# Patient Record
Sex: FEMALE | Race: AMERICAN INDIAN OR ALASKA NATIVE | NOT HISPANIC OR LATINO | Employment: FULL TIME | URBAN - METROPOLITAN AREA
[De-identification: names, ages, dates, MRNs, and addresses within clinical notes are randomized per-mention and may not be internally consistent; named-entity substitution may affect disease eponyms.]

---

## 2022-04-22 ENCOUNTER — TRANSFERRED RECORDS (OUTPATIENT)
Dept: HEALTH INFORMATION MANAGEMENT | Facility: CLINIC | Age: 57
End: 2022-04-22

## 2022-05-12 ENCOUNTER — HOSPITAL ENCOUNTER (OUTPATIENT)
Dept: BEHAVIORAL HEALTH | Facility: HOSPITAL | Age: 57
Discharge: HOME | End: 2022-05-12
Attending: SOCIAL WORKER | Admitting: SOCIAL WORKER
Payer: COMMERCIAL

## 2022-05-12 PROCEDURE — 90791 PSYCH DIAGNOSTIC EVALUATION: CPT | Performed by: SOCIAL WORKER

## 2022-05-12 ASSESSMENT — ANXIETY QUESTIONNAIRES
6. BECOMING EASILY ANNOYED OR IRRITABLE: MORE THAN HALF THE DAYS
2. NOT BEING ABLE TO STOP OR CONTROL WORRYING: NEARLY EVERY DAY
7. FEELING AFRAID AS IF SOMETHING AWFUL MIGHT HAPPEN: NEARLY EVERY DAY
1. FEELING NERVOUS, ANXIOUS, OR ON EDGE: NEARLY EVERY DAY
5. BEING SO RESTLESS THAT IT IS HARD TO SIT STILL: MORE THAN HALF THE DAYS
3. WORRYING TOO MUCH ABOUT DIFFERENT THINGS: NEARLY EVERY DAY
4. TROUBLE RELAXING: NEARLY EVERY DAY
IF YOU CHECKED OFF ANY PROBLEMS ON THIS QUESTIONNAIRE, HOW DIFFICULT HAVE THESE PROBLEMS MADE IT FOR YOU TO DO YOUR WORK, TAKE CARE OF THINGS AT HOME, OR GET ALONG WITH OTHER PEOPLE: EXTREMELY DIFFICULT
GAD7 TOTAL SCORE: 19

## 2022-05-12 ASSESSMENT — PATIENT HEALTH QUESTIONNAIRE - PHQ9: SUM OF ALL RESPONSES TO PHQ QUESTIONS 1-9: 20

## 2022-05-13 ASSESSMENT — ANXIETY QUESTIONNAIRES: GAD7 TOTAL SCORE: 19

## 2022-05-13 NOTE — PLAN OF CARE
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 memory difficulties, panic attacks, minimal energy or motivation and inability to complete tasks.  Brittney states that she has \"always been a go-getter\" and has had lots of energy, would enjoy being busy and enjoyed her life.  She states that she has experienced a significant decline in motivation, energy and is currently in the process of taking a leave of absence from her job so that she can take care of herself and her mental health.  Brittney states that she had a session with a therapist at [facility] who recommended she attend the [facility] program for more support and intense groups to assist her in learning coping skills to deal with depressive and anxiety symptoms. .  2. Psychosocial, Cultural and Contextual Factors: family of origin issues, mental health symptoms, occupational / vocational stress, parent-child stress and relationship stress .  3. Principal DSM5 Diagnoses  (Sustained by DSM5 Criteria Listed Above):   296.33 (F33.2) Major Depressive Disorder, Recurrent Episode, Severe _ and With anxious distress.  4. Other Diagnoses " that is relevant to services:   309.81 (F43.10) Posttraumatic Stress Disorder (includes Posttraumatic Stress Disorder for Children 6 Years and Younger)  Without dissociative symptoms.  5. Prognosis: Expect Improvement and Relieve Acute Symptoms.  6. Likely result of symptoms if not treated: inabliity to return to work, possible hospitalization    I certify that Partial Hospitalization (PHP) services are medically necessary to improve or maintain the client's condition and functional level and to prevent relapse or hospitalization.  PHP services will be provided in lieu of psychiatric hospitalization, no less intensive level of care would be sufficient to provide the medically necessary treatment the client requires.  These services will include group therapy and OT group therapy daily and individual therapy and medications management as needed.  Due to the clinical severity of the symptoms reported by the client, functional impairments exist that significantly disrupt functioning.  The client reports these symptoms negatively impact their quality of life.  Without the recommended medically necessary treatments listed, the client's symptoms are likely to increase in severity and functioning may further decline.  If the client participates and complies with recommended treatment, the prognosis if fair.    Recommendations:     1. Attend and complete the Partial Hospitalization Program.    2.  Plan for Safety and Risk Management:   Recommended that patient call 911 or go to the local ED should there be a change in any of these risk factors..            3. Patient's identified no cultural concerns that will impact her treatment in PHP.      4. Resources/Service Plan:    services are not indicated.   Modifications to assist communication are not indicated.   Additional disability accommodations are not indicated.   The patient  MAY utilize the Alpha Stimulator therapy.   Patient Does not have a pacemaker.        5. Collaboration:   Collaboration / coordination of treatment will be initiated with the following  support professionals: outpatient therapist.      6.  Referrals:  Qualifies for ARMHS (Adult Rehabilitative Mental Health Services) to rehabilitate the areas of functional impairments.    It is my professional opinion that patient:     1. Has symptoms of mental illness that impair function in the following areas:       Mental health symptoms, Mental health service needs, Securing or maintaining employment, Interpersonal skills and Self-care / Independent living     2. Rehabilitative mental health services would reduce symptoms to allow regulated, restored or improved functioning.  Maintain stability, function, preventing risk of significant functional decompensation or more restrictive service setting.      3. Has the cognitive capacity to benefit from rehabilitative mental health techniques and methods.     The following referral(s) will be initiated: Psychiatry. Next Scheduled Appointment: TBD.     A Release of Information has been obtained for the following: outpatient therapist.    7. RAYMOND:    No concerns    8. Records:   These were reviewed at time of assessment.   Information in this assessment was obtained from the medical record and provided by patient who is a good historian.    Patient will have open access to their mental health medical record.      Provider Name/ Credentials:  Nova Barr, [facility]   May 12, 2022                           normal...

## 2022-05-16 ENCOUNTER — HOSPITAL ENCOUNTER (OUTPATIENT)
Dept: BEHAVIORAL HEALTH | Facility: HOSPITAL | Age: 57
Discharge: HOME | End: 2022-05-16
Attending: PSYCHIATRY & NEUROLOGY | Admitting: PSYCHIATRY & NEUROLOGY
Payer: COMMERCIAL

## 2022-05-16 PROCEDURE — H0035 MH PARTIAL HOSP TX UNDER 24H: HCPCS | Performed by: SOCIAL WORKER

## 2022-05-16 NOTE — PROGRESS NOTES
Group Therapy Progress Notes     Client Initial Individualized Goals for Treatment:     Client will learn and practice 1 - 2 coping skills to help improve depression and anxiety by interrupting symptoms with skills.       Area of Treatment Focus:  Cultural/ Racial / Health / Spiritual    Therapeutic Interventions/Treatment Strategies:  Provide education regarding sleep hygiene    Response to Treatment Strategies:  Accepted Feedback, Gave Feedback, Listened , Focused on Goals and Attentive    Name of Groups:  Sleep - Time: 11:10-12:00    Description and Therapeutic Outcome:   OT Life Skills Group - Healthy Sleep Habits  Clients will explore healthy sleep habits to utilize as well as look at their current sleep habits. Clients will identify importance of sleep for overall physical and mental health, receive education on how lack of sleep affects the body, identify barriers to healthy sleep, develop a healthy sleep routine, and utilize handouts and sleep diary to track sleep habits and daily routines affecting sleep.    Brittney notes that her sleep has been less lately due to being busy.        Is this a Weekly Review of the Progress on the Treatment Plan?  YES    Are Treatment Plan Goals being addressed?  YES      Are Treatment Plan Strategies to Address Goals Effective?  YES      Are there any current contracts in place?  YES

## 2022-05-16 NOTE — TREATMENT PLAN
Individualized Treatment Plan     Date of Plan: May 16, 2022    Name: Brittney Oliveira MRN: [id number]    : 1965    Programs:  [facility] ()     DSM-V Diagnoses:  296.33 (F33.2) Major Depressive Disorder, Recurrent Episode, Severe _ and With anxious distress;  Complex PTSD per history   DA Date: 22  Psychosocial & Contextual Factors: family of origin issues, health issues, mental health symptoms, occupational / vocational stress and parent-child stress  WHODAS: 46  LOCUS: 21      Team Members Contributing to Plan:  Dr. Jory Alanis, [facility]  Nova Barr, [facility]  Suzan Frederick, [facility]    Client Strengths:  caring, committed to sobriety, educated, empathetic, employed, goal-focused, good listener, has a previous history of therapy, insightful, intelligent, motivated, open to learning, open to suggestions / feedback, responsible parent, support of family, friends and providers, supportive, wants to learn, willing to ask questions, willing to relate to others and work history    Client Participation in Plan:  Agrees with plan   Received copy of treatment plan     Areas of Vulnerability:  Suicidal Ideation   Anxiety  Depressive symptoms   Physical/medical: lingering Covid symptoms   Trauma/Abuse/Neglect    Long-Term Goals:  Knowledge about illness and management of symptoms   Maintenance of personal safety     Abuse Prevention Plan:  Safe, therapeutic environment   Safety coping plan as needed   Education regarding illness and skill development     Discharge Criteria:  Satisfactory progress toward treatment goals   Improvement re: identified problems and symptoms   Ability to continue recovery at next level of service   Has a discharge plan in place   Regular attendance as scheduled                   Areas of Treatment Focus            Area of Treatment Focus:   Personal Safety  Start Date:    22    Goal:  Target Date:  Status: Active  Client will learn and practice 1 - 2  coping skills to help improve depression and anxiety by interrupting symptoms with skills.      Progress:             Treatment Strategies:   Assist clients in establishing / strengthening support network  Assist to identify treatment goals  Assist with discharge planning  Assess / reassess for appropriate therapy program involvement, encourage participation in therapies  Assess / reassess level of potential for harm to self or others  Engage in safety planning when indicated  Facilitate increased self awareness  Teach adaptive coping skills and communication skills  Use reality based supportive approach    These services will include group therapy and OT group therapy daily and individual therapy and medications management as needed.                 Area of Treatment Focus:   Symptom Stabilization and Management  Start Date:    5/16/22    Goal:  Target Date: 5/20/22 Status: Active  Will Improve Mindfulness / Stay in the Here and Now Intentionally bringing your attention to something beautiful, pleasant, or interesting that is occurring or is present in your immediate environment or experience on a regular basis.      Progress:             Treatment Strategies:   Assist clients in establishing / strengthening support network  Assist to identify treatment goals  Assist with discharge planning  Engage in safety planning when indicated  Facilitate increased self awareness  Teach adaptive coping skills and communication skills  Use reality based supportive approach    These services will include group therapy and OT group therapy daily and individual therapy and medications management as needed.               Area of Treatment Focus:   Community Resources / Support and Discharge Planning  Start Date:    5/23/22    Goal:  Target Date: 5/31/22 Status: Active  Will increase effective use of support / increase ability to ask for help. Identify support needs, create a list of things you could use help with, Identify  attitudes or beliefs about asking for help that interfere with your ability to ask for help and identify more helpful attitudes and Create a return to work, return to school, or return to daily routines plan.      Progress:             Treatment Strategies:   Assist clients in establishing / strengthening support network  Assist with discharge planning  Engage in safety planning when indicated  Facilitate increased self awareness  Teach adaptive coping skills and communication skills  Use reality based supportive approach    These services will include group therapy and OT group therapy daily and individual therapy and medications management as needed.
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
## 2022-05-16 NOTE — PROGRESS NOTES
"Group Therapy Progress Notes     Client Initial Individualized Goals for Treatment:     Client will learn and practice 1 - 2 coping skills to help improve depression and anxiety by interrupting symptoms with skills.    Area of Treatment Focus:  Personal Safety    Therapeutic Interventions/Treatment Strategies:  Assist clients in establishing / strengthening support network  Assist to identify treatment goals  Assist with discharge planning  Assess / reassess for appropriate therapy program involvement, encourage participation in therapies  Assess / reassess level of potential for harm to self or others  Engage in safety planning when indicated  Facilitate increased self awareness  Teach adaptive coping skills and communication skills  Use reality based supportive approach    Response to Treatment Strategies:  Accepted Feedback, Gave Feedback, Listened  and Attentive    Name of Groups:  Dialctical Communication - Time: 10-10:50;     Description and Therapeutic Outcome:     During Dialectical Communication group, Brittney presented as moderately tired, but easily responsive when prompted. She applied the concept of being Dialectical to a \"negative\" friendship by applying 3 Good Things about the person to implement the middle ground.     During the follow up group, Brittney related the concept of being Dialectical to having a diagnosis of Mental Health and the Middle Ground as focusing on self care and her wellness by attending PHP. Brittney had been through DBT but \"did not at that time grasp the concept of being Dialectical\" until today. She also accepted the importance of finding the Middle Ground as 'Relationship is more important than being right\". Brittney presents as intentional regarding her wellness and remains appropriate for PHP.     Is this a Weekly Review of the Progress on the Treatment Plan?  NO    Are Treatment Plan Goals being addressed?  YES      Are Treatment Plan Strategies to Address Goals " Effective?  YES      Are there any current contracts in place?  YES

## 2022-05-17 ENCOUNTER — HOSPITAL ENCOUNTER (OUTPATIENT)
Dept: BEHAVIORAL HEALTH | Facility: HOSPITAL | Age: 57
Discharge: HOME | End: 2022-05-17
Attending: PSYCHIATRY & NEUROLOGY | Admitting: PSYCHIATRY & NEUROLOGY
Payer: COMMERCIAL

## 2022-05-17 PROCEDURE — 90791 PSYCH DIAGNOSTIC EVALUATION: CPT | Performed by: NURSE PRACTITIONER

## 2022-05-17 PROCEDURE — H0035 MH PARTIAL HOSP TX UNDER 24H: HCPCS | Performed by: SOCIAL WORKER
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
## 2022-05-17 NOTE — PROGRESS NOTES
Group Therapy Progress Notes     Client Initial Individualized Goals for Treatment: Client will learn and practice 1 - 2 coping skills to help improve depression and anxiety by interrupting symptoms with skills    Area of Treatment Focus:  Personal Safety    Therapeutic Interventions/Treatment Strategies:  Assist clients in establishing / strengthening support network  Assist to identify treatment goals  Assess / reassess for appropriate therapy program involvement, encourage participation in therapies  Assess / reassess level of potential for harm to self or others  Engage in safety planning when indicated  Facilitate increased self awareness  Teach adaptive coping skills and communication skills  Use reality based supportive approach    Response to Treatment Strategies:  Accepted Feedback, Gave Feedback, Listened  and Attentive    Name of Groups:  Pain Management - Time: 11:10-12:00    Description and Therapeutic Outcome:   Pain Management  OT life skills group with focus on pain management and its relation to physical and emotional pain.   Education provided on tips to manage both emotional and physical pain, including the anti- inflammatory diet, adapting your home environment to reduce pain and manage fatigue, creating a healthy support network and socializing.  Handout given with focus on balance and arthritis friendly exercises, body scan and body awareness.  In OT group today, Brittney presented as quiet but engaged.  States that she experiences some chronic pain.  Discussed how feeling emotional pain can also play in a role in physical pain she feels and how she needs to work towards addressing the emotional pain as well.         Is this a Weekly Review of the Progress on the Treatment Plan?  NO    Are Treatment Plan Goals being addressed?  YES      Are Treatment Plan Strategies to Address Goals Effective?  YES      Are there any current contracts in place?  YES

## 2022-05-17 NOTE — PROGRESS NOTES
Group Therapy Progress Notes     Client Initial Individualized Goals for Treatment:     Client will learn and practice 1 - 2 coping skills to help improve depression and anxiety by interrupting symptoms with skills.    Area of Treatment Focus:  Personal Safety    Therapeutic Interventions/Treatment Strategies:  Assist clients in establishing / strengthening support network  Assist to identify treatment goals  Assist with discharge planning  Assess / reassess for appropriate therapy program involvement, encourage participation in therapies  Assess / reassess level of potential for harm to self or others  Engage in safety planning when indicated  Facilitate increased self awareness  Teach adaptive coping skills and communication skills  Use reality based supportive approach    Response to Treatment Strategies:  Accepted Feedback, Gave Feedback, Listened  and Attentive    Name of Groups:  Psychotherapy wrap up group - 2-3    Description and Therapeutic Outcome:   In psychotherapy wrap up group, Brittney reviewed her take away from today - reviewed Changing my thinking, the THINK skill and mastery with 3 cycles of 21.  She engaged very well giving feedback and support to a peer with just starting yesterday - is presenting as very comfortable in the group.  She reports really liking the concept of 3 cycle of 21 days and understanding how an event becomes PTSD and how it stays.  In the past, she states she doesn't know how to heal it - today the concepts made sense about how to start the healing process with the concept of PTG.  Good discussion about triggers and sensory triggers in our body that can not be controlled - discussed additional sensory ideas to combat this as well as the process of awareness, interruption and implementing a skill.  Really likes the skill of that was then, this is now and reminding herself of this.  Good group for Brittney - she remains appropriate for PHP.      Is this a Weekly Review of the  Progress on the Treatment Plan?  NO    Are Treatment Plan Goals being addressed?  YES      Are Treatment Plan Strategies to Address Goals Effective?  YES      Are there any current contracts in place?  YES

## 2022-05-18 ENCOUNTER — HOSPITAL ENCOUNTER (OUTPATIENT)
Dept: BEHAVIORAL HEALTH | Facility: HOSPITAL | Age: 57
Discharge: HOME | End: 2022-05-18
Attending: PSYCHIATRY & NEUROLOGY | Admitting: PSYCHIATRY & NEUROLOGY
Payer: COMMERCIAL

## 2022-05-18 PROCEDURE — H0035 MH PARTIAL HOSP TX UNDER 24H: HCPCS | Performed by: SOCIAL WORKER
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
## 2022-05-18 NOTE — PROGRESS NOTES
Group Therapy Progress Notes     Client Initial Individualized Goals for Treatment:     Client will learn and practice 1 - 2 coping skills to help improve depression and anxiety by interrupting symptoms with skills.    Area of Treatment Focus:  Cultural/ Racial / Health / Spiritual    Therapeutic Interventions/Treatment Strategies:  Provide education regarding exercise.    Response to Treatment Strategies:  Accepted Feedback, Gave Feedback, Listened  and Focused on Goals    Name of Groups:  Exercise - Time: 11:10-12:00    Description and Therapeutic Outcome:   Exercise  OT life skills group with focus on the benefits of exercise.  Education provided with handouts on positive effects of exercise and mental health, different activities to try, ways to improve follow through, how to take your pulse, and target heart rates for age.  Worksheets provided for clients to create individualized exercise goals and exercise log. Theraband is offered with demonstrations as well as a handouts with specific theraband home exercise program guidelines.      Brittney participated in group today. Does use humor throughout group. Issued maroon and blue t-band.        Is this a Weekly Review of the Progress on the Treatment Plan?  NO    Are Treatment Plan Goals being addressed?  YES      Are Treatment Plan Strategies to Address Goals Effective?  YES      Are there any current contracts in place?  YES
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
## 2022-05-19 ENCOUNTER — TELEPHONE (OUTPATIENT)
Dept: BEHAVIORAL HEALTH | Facility: HOSPITAL | Age: 57
End: 2022-05-19
Payer: COMMERCIAL

## 2022-05-19 ENCOUNTER — HOSPITAL ENCOUNTER (OUTPATIENT)
Dept: BEHAVIORAL HEALTH | Facility: HOSPITAL | Age: 57
Discharge: HOME | End: 2022-05-19
Attending: PSYCHIATRY & NEUROLOGY | Admitting: PSYCHIATRY & NEUROLOGY
Payer: COMMERCIAL

## 2022-05-19 PROCEDURE — H0035 MH PARTIAL HOSP TX UNDER 24H: HCPCS | Performed by: SOCIAL WORKER

## 2022-05-19 NOTE — PROGRESS NOTES
Group Therapy Progress Notes     Client Initial Individualized Goals for Treatment:     Client will learn and practice 1 - 2 coping skills to help improve depression and anxiety by interrupting symptoms with skills.    Area of Treatment Focus:  Cultural/ Racial / Health / Spiritual    Therapeutic Interventions/Treatment Strategies:  Provide education regarding nutrition and mental health.    Response to Treatment Strategies:  Accepted Feedback, Gave Feedback, Listened , Focused on Goals and Attentive    Name of Groups:  Nutrition - Time: 11:10-12:00    Description and Therapeutic Outcome:   OT wellness group on Nutrition (diet/healthy food)  This group explores a variety of topics and educates on the relationship between physical health and mental health and how these aspects support the other.  Clients will learn skills to help with utilizing a balanced diet for health management.  Clients will increase knowledge and awareness on how exercise supports self care and wellness, identify barriers to healthy food choices, and will develop a feasible plan to obtain a healthy meal plan.  Clients will learn how to read Nutrition Facts Labels and will develop mindfulness skills related to eating.    Brittney was quite during group.       Is this a Weekly Review of the Progress on the Treatment Plan?  NO    Are Treatment Plan Goals being addressed?  YES      Are Treatment Plan Strategies to Address Goals Effective?  YES      Are there any current contracts in place?  YES

## 2022-05-19 NOTE — TELEPHONE ENCOUNTER
Writer faxed [place] forms to:    [place] [id number]  Attn: Terrie Schoenbauer  P: [phone]  F: [phone]    Copy made, original sent to scanning.

## 2022-05-19 NOTE — PROGRESS NOTES
Group Therapy Progress Notes     Client Initial Individualized Goals for Treatment:     Client will learn and practice 1 - 2 coping skills to help improve depression and anxiety by interrupting symptoms with skills.    Area of Treatment Focus:  Personal Safety    Therapeutic Interventions/Treatment Strategies:  Assist clients in establishing / strengthening support network  Assist to identify treatment goals  Assist with discharge planning  Assess / reassess for appropriate therapy program involvement, encourage participation in therapies  Assess / reassess level of potential for harm to self or others  Engage in safety planning when indicated  Facilitate increased self awareness  Teach adaptive coping skills and communication skills  Use reality based supportive approach    Response to Treatment Strategies:  Accepted Feedback, Gave Feedback, Listened  and Focused on Goals    Name of Groups:  Codependency - Time: 10-10:50; 1-1:50    Description and Therapeutic Outcome:     During Codependency group, Brittney presented as alert, responsive and with humor. She grasps the concept taught and identified ten of the 14 characteristics on the worksheets relating to Codependency. She accepts that Codependency is a challenge for her. This is evidenced in having difficulty saying No without applying an excuse. Also, difficulty saying NO when tired or busy.    Mone accepts that not saying NO is enabling and hurts rather than helps the other person. She recognizes resentful  feelings when people are not grateful for her efforts at fixing or rescuing their lives. Brittney will apply the 3 C's; Awareness, Ride the Wave; Drop the Rope, and Assertiveness to support being non-codependent. She presents as engaged, provides feedback and remains appropriate for PHP.    Is this a Weekly Review of the Progress on the Treatment Plan?  NO    Are Treatment Plan Goals being addressed?  NO      Are Treatment Plan Strategies to Address Goals  Effective?  YES      Are there any current contracts in place?  YES
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
## 2022-05-20 ENCOUNTER — TELEPHONE (OUTPATIENT)
Dept: BEHAVIORAL HEALTH | Facility: HOSPITAL | Age: 57
End: 2022-05-20
Payer: COMMERCIAL

## 2022-05-23 ENCOUNTER — HOSPITAL ENCOUNTER (OUTPATIENT)
Dept: BEHAVIORAL HEALTH | Facility: HOSPITAL | Age: 57
Discharge: HOME | End: 2022-05-23
Attending: PSYCHIATRY & NEUROLOGY | Admitting: PSYCHIATRY & NEUROLOGY
Payer: COMMERCIAL

## 2022-05-23 PROCEDURE — H0035 MH PARTIAL HOSP TX UNDER 24H: HCPCS | Performed by: SOCIAL WORKER

## 2022-05-23 NOTE — PROGRESS NOTES
Group Therapy Progress Notes     Client Initial Individualized Goals for Treatment:     Client will learn and practice 1 - 2 coping skills to help improve depression and anxiety by interrupting symptoms with skills.    Area of Treatment Focus:  Personal Safety    Therapeutic Interventions/Treatment Strategies:  Assist clients in establishing / strengthening support network  Assist to identify treatment goals  Assist with discharge planning  Assess / reassess for appropriate therapy program involvement, encourage participation in therapies  Assess / reassess level of potential for harm to self or others  Engage in safety planning when indicated  Facilitate increased self awareness  Teach adaptive coping skills and communication skills  Use reality based supportive approach    Response to Treatment Strategies:  Accepted Feedback, Gave Feedback, Listened  and Attentive    Name of Groups:  Psychotherapy group 9-10am    Description and Therapeutic Outcome:   In psychotherapy group, today was Brittney's first day of PHP.  She presented as anxious and quiet but states that she was looking forward to starting the program.  Brittney shares that wants to learn skills that will help her cope with anxiety and depression so that she can return to work.  She was engaged with the group discussions and was supportive to her peers. Brittney is appropriate for PHP.    Is this a Weekly Review of the Progress on the Treatment Plan?  NO    Are Treatment Plan Goals being addressed?  YES      Are Treatment Plan Strategies to Address Goals Effective?  YES      Are there any current contracts in place?  YES

## 2022-05-23 NOTE — PROGRESS NOTES
"Group Therapy Progress Notes     Client Initial Individualized Goals for Treatment:     Will Improve Mindfulness / Stay in the Here and Now Intentionally bringing your attention to something beautiful, pleasant, or interesting that is occurring or is present in your immediate environment or experience on a regular basis.    Area of Treatment Focus:  Symptom Stabilization and Management    Therapeutic Interventions/Treatment Strategies:  Assist clients in establishing / strengthening support network  Assist to identify treatment goals  Assist with discharge planning  Engage in safety planning when indicated  Facilitate increased self awareness  Teach adaptive coping skills and communication skills  Use reality based supportive approach    Response to Treatment Strategies:  Accepted Feedback, Gave Feedback, Listened  and Focused on Goals    Name of Groups:  Coping Skills - Time: 10-10:50; Coping with Shame 1-1:50    Description and Therapeutic Outcome:     During Coping Skills group, Brittney presented as \"tired\" though participatory. She applied during an exercise: 3 Good Things: I am thoughtful; Hardworking; I am saying NO. She is practicing and applying using the GAP, AAA, the THE skill, 3 C's.   Brittney also easily provided 3 Good Things for a peer. She also used \"Not Today\" in humour and is working on PTG.    During Coping With Shame group, Brittney identified her Shame Shield as \"Moving Away\" which relates to withdrawing, hiding, and silencing ourselves. She is aware of this and will focus on the AAA- Awareness, Acceptance, Action; Assertiveness and Boundaries. Brittney also elaborated on the Dialectical skill for a peer to grasp the concept. She presents as intentional regarding her wellness and remains appropriate for PHP.    Is this a Weekly Review of the Progress on the Treatment Plan?  YES    Are Treatment Plan Goals being addressed?  YES      Are Treatment Plan Strategies to Address Goals " Effective?  YES      Are there any current contracts in place?  YES

## 2022-05-23 NOTE — PROGRESS NOTES
"Group Therapy Progress Notes     Client Initial Individualized Goals for Treatment:     Client will learn and practice 1 - 2 coping skills to help improve depression and anxiety by interrupting symptoms with skills.    Area of Treatment Focus:  Personal Safety    Therapeutic Interventions/Treatment Strategies:  Assist clients in establishing / strengthening support network  Assist to identify treatment goals  Assist with discharge planning  Assess / reassess for appropriate therapy program involvement, encourage participation in therapies  Assess / reassess level of potential for harm to self or others  Engage in safety planning when indicated  Facilitate increased self awareness  Teach adaptive coping skills and communication skills  Use reality based supportive approach    Response to Treatment Strategies:  Accepted Feedback, Gave Feedback, Listened  and Focused on Goals    Name of Groups:  Psychotherapy group 9-10a    Description and Therapeutic Outcome:   In psychotherapy group, Brittney shares that she had a good night.  She states that she did not do what she planned to do and agreed to give a couple of people who she is close to rides and one of them grocery shopping.  She struggled with being told that it's ok to say NO if she is tired or does not feel like giving people rides, etc.  She states she feels like she should do this if she doesn't have a reason not to but did acknowledge that she was tired after group yesterday and wanted to go to bed early.  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  She states that she understands that she should say NO more often but that she feels better and more like herself when she helps others and that she was \"ok with her Yes\" to her friends but agrees that she needs to practice NO for the days she does not have the energy.  Brittney was very engaged in group this morning, she continues to be appropriate for PHP.    Is this a Weekly " Review of the Progress on the Treatment Plan?  NO    Are Treatment Plan Goals being addressed?  YES      Are Treatment Plan Strategies to Address Goals Effective?  YES      Are there any current contracts in place?  YES

## 2022-05-23 NOTE — PROGRESS NOTES
Group Therapy Progress Notes     Client Initial Individualized Goals for Treatment:     Will Improve Mindfulness / Stay in the Here and Now Intentionally bringing your attention to something beautiful, pleasant, or interesting that is occurring or is present in your immediate environment or experience on a regular basis.    Area of Treatment Focus:  Symptom Stabilization and Management    Therapeutic Interventions/Treatment Strategies:  Assist clients in establishing / strengthening support network  Assist to identify treatment goals  Assist with discharge planning  Engage in safety planning when indicated  Facilitate increased self awareness  Teach adaptive coping skills and communication skills  Use reality based supportive approach    Response to Treatment Strategies:  Accepted Feedback, Gave Feedback, Listened  and Focused on Goals    Name of Groups:  Psychotherapy wrap up group - 2-3    Description and Therapeutic Outcome:   In psychotherapy wrap up group, Brittney reviewed her take away from today - she noted that she was much more tired today - reviewed that she is wondering if it is her body coming off of covid a month ago, the depression or the new medication she started.  Reviewed that if she is tired right now, her body likely needs it.  She plans to continue to monitor but is frustrated because this is not like her and she does not like how it feels.  Reviewed the concept of shame and shame shields - states she listened more to the group than participated.  She did like the exercise where they each had to go around the give 3 good things about themselves and then everyone went and said one thing about each of the group members.  Is practicing the concept of using the positive and not the negative - good discussion about not focusing on the why but the how.  Reviewed evening plans - will continue to work on being assertive and say no, plans to be intentional tonight.  No safety issues noted - she remains  appropriate for PHP.       Is this a Weekly Review of the Progress on the Treatment Plan?  No    Are Treatment Plan Goals being addressed?  YES      Are Treatment Plan Strategies to Address Goals Effective?  YES      Are there any current contracts in place?  YES

## 2022-05-23 NOTE — PROGRESS NOTES
Group Therapy Progress Notes     Client Initial Individualized Goals for Treatment:     Will Improve Mindfulness / Stay in the Here and Now Intentionally bringing your attention to something beautiful, pleasant, or interesting that is occurring or is present in your immediate environment or experience on a regular basis.       Area of Treatment Focus:  Symptom Stabilization and Management    Therapeutic Interventions/Treatment Strategies:  Provide education regarding medication management    Response to Treatment Strategies:  Accepted Feedback, Gave Feedback, Listened  and Focused on Goals    Name of Groups:  Medication Management - Time: 11:10-12:00    Description and Therapeutic Outcome:   Life Skills OT group - Medication Management  Clients will identify importance of medication compliance for recovery and address barriers to successful medication management.  Education provided in handouts and discussion on the general role of medications, side effects, drug interactions,  management strategies, and safe/effective use.  Clients will identify tools to support medication compliance.  The goal is to help clients understand the many aspects involved with medication management and to utilize tools to develop a routine to maximize independence and wellness recovery specific to medication management.  Brittney notes that she has gotten into the habit of taking her meds at the same time each day.       Is this a Weekly Review of the Progress on the Treatment Plan?  YES    Are Treatment Plan Goals being addressed?  YES      Are Treatment Plan Strategies to Address Goals Effective?  YES      Are there any current contracts in place?  YES

## 2022-05-23 NOTE — PROGRESS NOTES
"Group Therapy Progress Notes     Client Initial Individualized Goals for Treatment:     Client will learn and practice 1 - 2 coping skills to help improve depression and anxiety by interrupting symptoms with skills.    Area of Treatment Focus:  Personal Safety    Therapeutic Interventions/Treatment Strategies:  Assist clients in establishing / strengthening support network  Assist to identify treatment goals  Assist with discharge planning  Assess / reassess for appropriate therapy program involvement, encourage participation in therapies  Assess / reassess level of potential for harm to self or others  Engage in safety planning when indicated  Facilitate increased self awareness  Teach adaptive coping skills and communication skills  Use reality based supportive approach    Response to Treatment Strategies:  Accepted Feedback, Gave Feedback, Listened  and Focused on Goals    Name of Groups:  Psychotherapy wrap up group 2-3     Description and Therapeutic Outcome:   In psychotherapy wrap up group, Brittney shares she had a good day today and reviews her take away.  She states that she \"appears to have some codependency issues\" with humor but shares that she is more aware of this after the group.  She accepts that Codependency is a challenge for her and seems to struggle with saying No when she is tired or when someone needs a ride or money because she believes since she \"can\" help them she \"should\" help them.  She states she needs to focus on when its appropriate to help and when to say NO but still keep her positivity and compassion.  Mone accepts that not saying NO is enabling and hurts rather than helps the other person. She recognizes resentful  feelings when people are not grateful for her efforts at fixing or rescuing their lives. Brittney will apply the 3 C's; Awareness, Ride the Wave; Drop the Rope, and Assertiveness to support being non-codependent. She presents as engaged, provides feedback and remains " appropriate for PHP.    Is this a Weekly Review of the Progress on the Treatment Plan?  NO    Are Treatment Plan Goals being addressed?  NO      Are Treatment Plan Strategies to Address Goals Effective?  YES      Are there any current contracts in place?  YES

## 2022-05-23 NOTE — PROGRESS NOTES
MY COPING PLAN FOR SAFETY          The things that are most important to me and the reasons for living, are: Self and family              My relapse warning signs are: isolating and not talking  I will make my environment safer by: being aware of my surroundings  When in crisis, I will cope in the following ways: (relaxation/self-soothing/distraction/activity) call a family member/friend - relax or complete tasks for distraction  I will use my support system:   Personal Support: I can ask for reminders, support, them to stay with me  Trusted Friend/s:  True, Karolina, or Pema   Family Member/s : Caryl   Other/s: Torey                    Professional Support: I can ask for med changes, emergency appointments, help  Psychiatrist: N/A   Therapist: Hema Calabrese     Other/s: David Saldivar [facility]     I can call a crisis line to know of options I can t see when I am this depressed, anxious or confused:     [place] Area:  [facility], Crisis stabilization [facility]- [phone]  Replaced by [facility] Crisis Line: [phone]  [facility]: [phone]  Sexual Assault Program [facility]: [phone] or [phone]  [place] [facility]: [phone] Ext: 279       Calls answered Mon-Fri-8:00 am--4:30 pm    [place]:  [facility]: [phone]  [facility] first call for help: [phone]  [facility]:  [phone]    [place] Area:  Warm Line: [phone]       Calls answered Tuesday--Saturday 4:00 pm--10:00 pm  [place] Crisis Line - [phone]  [place] [facility] [phone]    [place] Statewide:  [place] Crisis and Referral Services: 4-416-523-7099  National Suicide Prevention Lifeline: 1-900-269-TALK (9764)   - uqm3dzyh- Text  Life  to [address]49[address]  First Call for Help: 2-1-1  [facility] Helpline- 8-142-NNRE-HELP       I will attend my Treatment Program and talk to my one of my therapists: Nova Mares, or Marleen Lugo   I agree  that I will report any thoughts, impulses or plans of suicide, homicide, SIB or substance relapse as they occur, during the treatment day.   Brittney   I agree that I will not act on the above symptoms, but will follow the above plan instead.    If nothing above is working for me, I can go to:   Emergency Care Department - Located at the [address] [address]   You can reach us directly by calling [phone] or call the [facility] center at [phone] or [phone].

## 2022-05-23 NOTE — PROGRESS NOTES
Group Therapy Progress Notes     Client Initial Individualized Goals for Treatment:     Client will learn and practice 1 - 2 coping skills to help improve depression and anxiety by interrupting symptoms with skills.    Area of Treatment Focus:  Personal Safety    Therapeutic Interventions/Treatment Strategies:  Assist clients in establishing / strengthening support network  Assist to identify treatment goals  Assist with discharge planning  Assess / reassess for appropriate therapy program involvement, encourage participation in therapies  Assess / reassess level of potential for harm to self or others  Engage in safety planning when indicated  Facilitate increased self awareness  Teach adaptive coping skills and communication skills  Use reality based supportive approach    Response to Treatment Strategies:  Accepted Feedback, Gave Feedback, Listened  and Focused on Goals    Name of Groups:  Psychotherapy group 9-10am    Description and Therapeutic Outcome:   In psychotherapy group, Brittney shares that she was exhausted after group yesterday and took a nap once she got home.  She states that she [place] the dump with a friend to find eagle feathers and enjoyed getting outside and going for a walk.  She states that she had a good dinner last night and went to bed early. She shares that she liked the topics she learned about yesterday and that she has not learned about Dialectical before and that she realizes that [place] is going to help her and she admits she often becomes defensive and that this and other skills will help her with relationships and with interrupting negative thoughts.  She states that she applied present moment and drop the rope last night when she started having negative thoughts.  Brittney continues to be appropriate for PHP.      Is this a Weekly Review of the Progress on the Treatment Plan?  NO    Are Treatment Plan Goals being addressed?  YES      Are Treatment Plan Strategies to Address  Goals Effective?  YES      Are there any current contracts in place?  YES

## 2022-05-23 NOTE — PROGRESS NOTES
"Group Therapy Progress Notes     Client Initial Individualized Goals for Treatment:     Client will learn and practice 1 - 2 coping skills to help improve depression and anxiety by interrupting symptoms with skills.    Area of Treatment Focus:  Personal Safety    Therapeutic Interventions/Treatment Strategies:  Assist clients in establishing / strengthening support network  Assist to identify treatment goals  Assist with discharge planning  Assess / reassess for appropriate therapy program involvement, encourage participation in therapies  Assess / reassess level of potential for harm to self or others  Engage in safety planning when indicated  Facilitate increased self awareness  Teach adaptive coping skills and communication skills  Use reality based supportive approach    Response to Treatment Strategies:  Accepted Feedback, Gave Feedback, Listened  and Focused on Goals    Name of Groups:  Psychotherapy group 9-10am     Description and Therapeutic Outcome:   In psychotherapy group, she shares she had a good night. 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  She shares that she appreciated the positive message from him.  Brittney states that she went to the [employer] for dinner and saw her ex husbands wife there and was triggered with anxiety and PTSD symptoms. She states that she did take a PRN and used Present Moment and \"my name is\" .  She states that she did feel better once she got home and was able to eat her dinner and get a good nights sleep.  Brittney shares that she also applied drop the rope this morning so that she didn't ruminate about the casino situation. Brittney was positive and engaged this morning, she continues to be appropriate for PHP.      Is this a Weekly Review of the Progress on the Treatment Plan?  NO    Are Treatment Plan Goals " being addressed?  NO      Are Treatment Plan Strategies to Address Goals Effective?  YES      Are there any current contracts in place?  YES

## 2022-05-24 ENCOUNTER — HOSPITAL ENCOUNTER (OUTPATIENT)
Dept: BEHAVIORAL HEALTH | Facility: HOSPITAL | Age: 57
Discharge: HOME | End: 2022-05-24
Attending: PSYCHIATRY & NEUROLOGY | Admitting: PSYCHIATRY & NEUROLOGY
Payer: COMMERCIAL

## 2022-05-24 PROCEDURE — H0035 MH PARTIAL HOSP TX UNDER 24H: HCPCS | Performed by: SOCIAL WORKER

## 2022-05-24 NOTE — PROGRESS NOTES
"Group Therapy Progress Notes     Client Initial Individualized Goals for Treatment:     Will Improve Mindfulness / Stay in the Here and Now Intentionally bringing your attention to something beautiful, pleasant, or interesting that is occurring or is present in your immediate environment or experience on a regular basis.    Area of Treatment Focus:  Symptom Stabilization and Management    Therapeutic Interventions/Treatment Strategies:  Assist clients in establishing / strengthening support network  Assist to identify treatment goals  Assist with discharge planning  Engage in safety planning when indicated  Facilitate increased self awareness  Teach adaptive coping skills and communication skills  Use reality based supportive approach    Response to Treatment Strategies:  Accepted Feedback, Gave Feedback, Listened  and Focused on Goals    Name of Groups:  Self Compassion - Time: 10-10:50; 1-1:50    Description and Therapeutic Outcome:     During Self Compassion group, Brittney presented as focused, participatory and provided positive feedback to the group process. She valued a handout regarding \"Two Wolves\" as \"this relates to my culture. The stories are told and handed down\" over time. She recognizes the importance of Self Compassion and will be mindful to take care of herself as her tendency is to help others.    During the follow up group, Brittney identified that she will work on Meditation for her Self Compassion. She responded that being out in  nature is important to her and her spirituality as well as eagle feathers. She will also work on \"Think Feel Choose\" ; \"Watch your Language\" to be mindful to say kind things to herself; Present Moment; Post Traumatic Growth; 3 C's and 3 Good Things. Brittney presents as intentional regarding her wellness and remains appropriate for PHP.    Is this a Weekly Review of the Progress on the Treatment Plan?  NO    Are Treatment Plan Goals being addressed?  YES      Are " Treatment Plan Strategies to Address Goals Effective?  YES      Are there any current contracts in place?  YES

## 2022-05-24 NOTE — PROGRESS NOTES
Group Therapy Progress Notes     Client Initial Individualized Goals for Treatment:     Will Improve Mindfulness / Stay in the Here and Now Intentionally bringing your attention to something beautiful, pleasant, or interesting that is occurring or is present in your immediate environment or experience on a regular basis.    Area of Treatment Focus:  Symptom Stabilization and Management    Therapeutic Interventions/Treatment Strategies:  Assist clients in establishing / strengthening support network  Assist to identify treatment goals  Assist with discharge planning  Engage in safety planning when indicated  Facilitate increased self awareness  Teach adaptive coping skills and communication skills  Use reality based supportive approach    Response to Treatment Strategies:  Accepted Feedback, Gave Feedback, Listened  and Focused on Goals    Name of Groups:  Psychotherapy wrap up group - 2-3    Description and Therapeutic Outcome:   In psychotherapy wrap up group, Brittney reviewed her take away from today - she was bright and engaging as she reported being more awake and alert today - worked last night but chose to for the right reasons so feels good about it.  Reviewed self compassion today - discussed PTG today but it triggered some PTSD for her - she was able to work through it in the group.  Liked the concept of self talk, to encourage self.  She states she learned growing up to never talk about things, which led to self criticism not self affirmations or self reliance.  Really liked the wolves poem - which ever you feed wins concept - which led to a great discussion about spirituality vs Jainism.  Really liked AAA, present moment, awareness, self talk - reviewed the issue with her son and how it makes her self of self fragile.  Brittney gave helpful feedback to a peer who was really struggling today.  Reviewed evening plans - will continue to work on present moment and plans to be intentional tonight.  No safety  issues noted - she remains appropriate for PHP.       Is this a Weekly Review of the Progress on the Treatment Plan?  No    Are Treatment Plan Goals being addressed?  YES      Are Treatment Plan Strategies to Address Goals Effective?  YES      Are there any current contracts in place?  YES

## 2022-05-24 NOTE — PROGRESS NOTES
Group Therapy Progress Notes     Client Initial Individualized Goals for Treatment: Will Improve Mindfulness / Stay in the Here and Now Intentionally bringing your attention to something beautiful, pleasant, or interesting that is occurring or is present in your immediate environment or experience on a regular basis    Area of Treatment Focus:  Symptom Stabilization and Management    Therapeutic Interventions/Treatment Strategies:  Assist clients in establishing / strengthening support network  Assist to identify treatment goals  Assist with discharge planning  Engage in safety planning when indicated  Facilitate increased self awareness  Teach adaptive coping skills and communication skills  Use reality based supportive approach    Response to Treatment Strategies:  Accepted Feedback, Gave Feedback, Listened , Focused on Goals and Attentive    Name of Groups:  Time Management - Time: 11:10-12:00    Description and Therapeutic Outcome:   Self development group - OT - Time Management  Time management group focuses on assisting clients to define self and increase positive self -regard.  Psychoeducation provided related to developing strategies/skills to support effective time management.  Clients will increase knowledge and awareness of time management, increase knowledge/awareness of skills/techniques/behaviors that support time management and when/how to utilize new time management strategies.  Clients will recognize that other peers share similar feelings, thoughts, and problems, and will expand their knowledge/skills through observing others self exploration and working through issues and personal development.  The goal is to help clients learn strategies to have success with time management ie. prioritizing, task analysis, limiting distractions, giving yourself enough time, and use of lists.  In OT group today Brittney presented as focused and engaged.  States that she is unable to accomplish as much as she has in  the past due to fatigue and MH.  Reports frustration with not knowing why she is feeling this way.  Encouraged Brittney to spend some time focusing on herself rather than working and doing things for others.         Is this a Weekly Review of the Progress on the Treatment Plan?  NO    Are Treatment Plan Goals being addressed?  YES      Are Treatment Plan Strategies to Address Goals Effective?  YES      Are there any current contracts in place?  YES

## 2022-05-25 ENCOUNTER — HOSPITAL ENCOUNTER (OUTPATIENT)
Dept: BEHAVIORAL HEALTH | Facility: HOSPITAL | Age: 57
Discharge: HOME | End: 2022-05-25
Attending: PSYCHIATRY & NEUROLOGY | Admitting: PSYCHIATRY & NEUROLOGY
Payer: COMMERCIAL

## 2022-05-25 PROCEDURE — H0035 MH PARTIAL HOSP TX UNDER 24H: HCPCS | Performed by: SOCIAL WORKER

## 2022-05-25 NOTE — PROGRESS NOTES
"Group Therapy Progress Notes     Client Initial Individualized Goals for Treatment: Will Improve Mindfulness / Stay in the Here and Now Intentionally bringing your attention to something beautiful, pleasant, or interesting that is occurring or is present in your immediate environment or experience on a regular basis    Area of Treatment Focus:  Symptom Stabilization and Management    Therapeutic Interventions/Treatment Strategies:  Assist clients in establishing / strengthening support network  Assist to identify treatment goals  Assist with discharge planning  Engage in safety planning when indicated  Facilitate increased self awareness  Teach adaptive coping skills and communication skills  Use reality based supportive approach    Response to Treatment Strategies:  Accepted Feedback, Gave Feedback, Listened , Focused on Goals, Attentive and Accepted Support    Name of Groups:  Healthy Goals - Time: 11:10-12:00    Description and Therapeutic Outcome:   Healthy Goals  OT life skills group with focus on identification of appropriate goals for overall health and wellbeing.  Clients will create individualized goals, identify barriers to meeting those goals, and problem solve to make goals achievable.  Educated in creating a SMART goal (Specific, Measurable, Attainable, Relevant, Timely).  \"Setting healthy goals\" handout provided as well as individualized goal setting worksheet.  In OT group today, Brittney is focused and engaged.  Identified a goal and worked through how she can make it SMART.         Is this a Weekly Review of the Progress on the Treatment Plan?  NO    Are Treatment Plan Goals being addressed?  YES      Are Treatment Plan Strategies to Address Goals Effective?  YES      Are there any current contracts in place?  YES             "

## 2022-05-25 NOTE — PROGRESS NOTES
"Group Therapy Progress Notes     Client Initial Individualized Goals for Treatment:     Will Improve Mindfulness / Stay in the Here and Now Intentionally bringing your attention to something beautiful, pleasant, or interesting that is occurring or is present in your immediate environment or experience on a regular basis.    Area of Treatment Focus:  Symptom Stabilization and Management    Therapeutic Interventions/Treatment Strategies:  Assist clients in establishing / strengthening support network  Assist to identify treatment goals  Assist with discharge planning  Engage in safety planning when indicated  Facilitate increased self awareness  Teach adaptive coping skills and communication skills  Use reality based supportive approach    Response to Treatment Strategies:  Accepted Feedback, Gave Feedback, Listened  and Focused on Goals    Name of Groups:  Grief - Time: 10-10:50; 1-1:50    Description and Therapeutic Outcome:     During Grief group, Brittney presented as attentive and participatory. She gave positive feedback regarding the topic. She was also able to relate to the fluctuating stages of grief. Brittney related Disenfranchised Grief to \"losing\" her son. She feels alienated from him and \"I will probably never be able to share personally with him again as what I share, he uses against me\". She is working on acceptance of where the relationship is at and will apply Time Limit to not ruminate on it.    During the follow up group, Brittney further identified grief to the loss of the ability to multi task due to age, mental and physical health. She is working on Present Moment, Time Limit, One Thing, 3 C's, Awareness and 3 Good Things. Brittney presents as intentional regarding her wellness and remains appropriate for PHP.    Is this a Weekly Review of the Progress on the Treatment Plan?  NO    Are Treatment Plan Goals being addressed?  YES      Are Treatment Plan Strategies to Address Goals " Effective?  YES      Are there any current contracts in place?  YES

## 2022-05-26 ENCOUNTER — HOSPITAL ENCOUNTER (OUTPATIENT)
Dept: BEHAVIORAL HEALTH | Facility: HOSPITAL | Age: 57
Discharge: HOME | End: 2022-05-26
Attending: PSYCHIATRY & NEUROLOGY | Admitting: PSYCHIATRY & NEUROLOGY
Payer: COMMERCIAL

## 2022-05-26 PROCEDURE — H0035 MH PARTIAL HOSP TX UNDER 24H: HCPCS | Performed by: SOCIAL WORKER

## 2022-05-26 NOTE — PROGRESS NOTES
Group Therapy Progress Notes     Client Initial Individualized Goals for Treatment:     Will Improve Mindfulness / Stay in the Here and Now Intentionally bringing your attention to something beautiful, pleasant, or interesting that is occurring or is present in your immediate environment or experience on a regular basis.    Area of Treatment Focus:  Symptom Stabilization and Management    Therapeutic Interventions/Treatment Strategies:  Assist clients in establishing / strengthening support network  Assist to identify treatment goals  Assist with discharge planning  Engage in safety planning when indicated  Facilitate increased self awareness  Teach adaptive coping skills and communication skills  Use reality based supportive approach    Response to Treatment Strategies:  Accepted Feedback, Gave Feedback, Listened  and Focused on Goals    Name of Groups:  Thinking Errors - Time: 10-10:50; 1-1:50    Description and Therapeutic Outcome:     During Thinking Errors group, Brittney presented as bright and participatory and volunteered to read the whole worksheet on the topic. She identified Filtering out the Positive as  a Common Thinking Error that she deals with. She recognizes the importance of noticing both the positive and the negative and will apply the Pros and Cons concept for balance.    During the follow up group, Brittney further identified:  Emotional Reasoning and Personalization as Common Thinking Errors. She will apply reality thinking by implementing the GAP and Rewiring as well as Know My Truth and 3 Good Things. Brittney presents as intentional regarding her wellness and remains appropriate for PHP.      Is this a Weekly Review of the Progress on the Treatment Plan?  NO    Are Treatment Plan Goals being addressed?  YES      Are Treatment Plan Strategies to Address Goals Effective?  YES      Are there any current contracts in place?  YES

## 2022-05-26 NOTE — PROGRESS NOTES
Group Therapy Progress Notes     Client Initial Individualized Goals for Treatment: Will Improve Mindfulness / Stay in the Here and Now Intentionally bringing your attention to something beautiful, pleasant, or interesting that is occurring or is present in your immediate environment or experience on a regular basis    Area of Treatment Focus:  Symptom Stabilization and Management    Therapeutic Interventions/Treatment Strategies:  Assist clients in establishing / strengthening support network  Assist to identify treatment goals  Assist with discharge planning  Engage in safety planning when indicated  Facilitate increased self awareness  Teach adaptive coping skills and communication skills  Use reality based supportive approach    Response to Treatment Strategies:  Accepted Feedback, Gave Feedback, Listened , Focused on Goals, Attentive and Accepted Support    Name of Groups:  Money Management - Time: 11:10-12:00    Description and Therapeutic Outcome:   OT - Life Skills - Money Management  In this group, clients learn the basics of money management as well as learn about their money management style (spenders, savers, amassers, conscientious manager and risk takers).  Will discuss what their style means for them and discuss basic skills they can use to improve their habits if needed.  Education and handout also given on savings tips, creating a budget and shopping tips.   In OT group today, Brittney presented as focused and engaged.  Stated that she does well with managing her money and budgeting.  Shares with her group some of her money saving tips.        Is this a Weekly Review of the Progress on the Treatment Plan?  NO    Are Treatment Plan Goals being addressed?  YES      Are Treatment Plan Strategies to Address Goals Effective?  YES      Are there any current contracts in place?  YES

## 2022-05-27 ENCOUNTER — TELEPHONE (OUTPATIENT)
Dept: BEHAVIORAL HEALTH | Facility: HOSPITAL | Age: 57
End: 2022-05-27
Attending: PSYCHIATRY & NEUROLOGY
Payer: COMMERCIAL

## 2022-05-27 ENCOUNTER — HOSPITAL ENCOUNTER (OUTPATIENT)
Dept: BEHAVIORAL HEALTH | Facility: HOSPITAL | Age: 57
Discharge: HOME | End: 2022-05-27
Attending: PSYCHIATRY & NEUROLOGY | Admitting: PSYCHIATRY & NEUROLOGY
Payer: COMMERCIAL

## 2022-05-27 PROCEDURE — H0035 MH PARTIAL HOSP TX UNDER 24H: HCPCS | Performed by: SOCIAL WORKER

## 2022-05-27 NOTE — TELEPHONE ENCOUNTER
Patient called stating she would be approximately 30 minutes late for PHP d/t prior engagements with her .

## 2022-05-27 NOTE — PROGRESS NOTES
Group Therapy Progress Notes     Client Initial Individualized Goals for Treatment: Will Improve Mindfulness / Stay in the Here and Now Intentionally bringing your attention to something beautiful, pleasant, or interesting that is occurring or is present in your immediate environment or experience on a regular basis    Area of Treatment Focus:  Symptom Stabilization and Management    Therapeutic Interventions/Treatment Strategies:  Assist clients in establishing / strengthening support network  Assist to identify treatment goals  Assist with discharge planning  Engage in safety planning when indicated  Facilitate increased self awareness  Teach adaptive coping skills and communication skills  Use reality based supportive approach    Response to Treatment Strategies:  Accepted Feedback, Gave Feedback, Listened , Focused on Goals and Attentive    Name of Groups:  Stress Management - Time: 11:10-12:00    Description and Therapeutic Outcome:   Education provided on sensory coping skills and how to identify when needing to alert up vs. calm down.  Scenarios provided and sensory coping skills identified to utilize in times of distress.  Education on emotional signs, physical signs, and behavioral signs when recognizing states of arousal to guide the need for calming vs alerting sensory input. Pt completed sensory toolbox kit: items offered include weighted tool, fidgets, aromatherapy, mind engaging activities, stress balls, crayons, and bubbles.  Verbalizes/demos understanding of sensory toolbox and what else could be added at home.  In OT group today, Brittney presented as focused and engaged.  Brittney created her own sensory kit and chose to make a weighted collar for coping and calming.           Is this a Weekly Review of the Progress on the Treatment Plan?  NO    Are Treatment Plan Goals being addressed?  YES      Are Treatment Plan Strategies to Address Goals Effective?  YES      Are there any current contracts in  place?  YES

## 2022-05-27 NOTE — PROGRESS NOTES
Group Therapy Progress Notes     Client Initial Individualized Goals for Treatment:     Will Improve Mindfulness / Stay in the Here and Now Intentionally bringing your attention to something beautiful, pleasant, or interesting that is occurring or is present in your immediate environment or experience on a regular basis.    Area of Treatment Focus:  Symptom Stabilization and Management    Therapeutic Interventions/Treatment Strategies:  Assist clients in establishing / strengthening support network  Assist to identify treatment goals  Assist with discharge planning  Engage in safety planning when indicated  Facilitate increased self awareness  Teach adaptive coping skills and communication skills  Use reality based supportive approach    Response to Treatment Strategies:  Accepted Feedback, Gave Feedback, Listened  and Focused on Goals    Name of Groups:  Spirituality - Time: 10-10:50; 1-1:50    Description and Therapeutic Outcome:     During Spirituality group, Brittney presented with a broad affect. She provided positive, insightful feedback to two peers that were graduating. Brittney especially highlighted for one peer the need for having support after graduation. She also easily engaged and provided insight regarding the topic as she was able to connect it to her culture and spirituality.     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 She shared positively about principles significant to her spirituality and her wellness journey: Love, Respect, Courage, Honesty, [place], Humility and Truth. She presents as intentional regarding her wellness and remains appropriate for PHP.    Is this a Weekly Review of the Progress on the Treatment Plan?  NO    Are Treatment Plan Goals being addressed?  YES      Are Treatment Plan Strategies to Address Goals Effective?  YES      Are there any current contracts in place?  YES

## 2022-05-29 NOTE — PROGRESS NOTES
Group Therapy Progress Notes     Client Initial Individualized Goals for Treatment:     Will Improve Mindfulness / Stay in the Here and Now Intentionally bringing your attention to something beautiful, pleasant, or interesting that is occurring or is present in your immediate environment or experience on a regular basis.    Area of Treatment Focus:  Symptom Stabilization and Management    Therapeutic Interventions/Treatment Strategies:  Assist clients in establishing / strengthening support network  Assist to identify treatment goals  Assist with discharge planning  Engage in safety planning when indicated  Facilitate increased self awareness  Teach adaptive coping skills and communication skills  Use reality based supportive approach    Response to Treatment Strategies:  Accepted Feedback, Gave Feedback, Listened  and Focused on Goals    Name of Groups:  Psychotherapy wrap up group - 2-3    Description and Therapeutic Outcome:   In psychotherapy wrap up group, Brittney reviewed her take away from today - reviewed grief and loss today along with the concept of disenfranchised grief.  Thought it was helpful to look at other kinds of loss other than death.  Looked at her family and job - reviewed the example of someone losing their leg - they heal but things never go back to the way it was.  She engaged well related to this.  Also reviewed goal making - [place] to make the path.  Will break it up into pieces at a time and work on one thing at a time.   Reviewed evening plans - will work on being intentional, time limits and let it go tonight.  No safety issues noted - she remains appropriate for PHP.       Is this a Weekly Review of the Progress on the Treatment Plan?  No    Are Treatment Plan Goals being addressed?  YES      Are Treatment Plan Strategies to Address Goals Effective?  YES      Are there any current contracts in place?  YES

## 2022-05-29 NOTE — PROGRESS NOTES
Group Therapy Progress Notes     Client Initial Individualized Goals for Treatment:     Will Improve Mindfulness / Stay in the Here and Now Intentionally bringing your attention to something beautiful, pleasant, or interesting that is occurring or is present in your immediate environment or experience on a regular basis.    Area of Treatment Focus:  Symptom Stabilization and Management    Therapeutic Interventions/Treatment Strategies:  Assist clients in establishing / strengthening support network  Assist to identify treatment goals  Assist with discharge planning  Engage in safety planning when indicated  Facilitate increased self awareness  Teach adaptive coping skills and communication skills  Use reality based supportive approach    Response to Treatment Strategies:  Accepted Feedback, Gave Feedback, Listened  and Focused on Goals    Name of Groups:  Psychotherapy wrap up group - 2-3    Description and Therapeutic Outcome:   In psychotherapy wrap up group, Brittney reviewed her take away from today - reviewed common thinking errors and budgeting today.   In reviewing the common thinking errors - she read all of them today for the group - learned that she filters out the positive - analyses too much.  Also was able to relate to the labeling/personalizing thinking errors also. Can counteract this by using Know my truth, GAP, THINK and rewire.  The money management was helpful as a reminder.  Brittney was more engaged today - less tired - she continues to be helpful and empathetic with another peer who is really struggling.  Reviewed evening plans - will work on self care and one thing.  No safety issues noted - she remains appropriate for PHP.       Is this a Weekly Review of the Progress on the Treatment Plan?  No    Are Treatment Plan Goals being addressed?  YES      Are Treatment Plan Strategies to Address Goals Effective?  YES      Are there any current contracts in place?  YES
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
## 2022-06-01 ENCOUNTER — HOSPITAL ENCOUNTER (OUTPATIENT)
Dept: BEHAVIORAL HEALTH | Facility: HOSPITAL | Age: 57
Discharge: HOME | End: 2022-06-01
Attending: PSYCHIATRY & NEUROLOGY | Admitting: PSYCHIATRY & NEUROLOGY
Payer: COMMERCIAL

## 2022-06-01 PROCEDURE — H0035 MH PARTIAL HOSP TX UNDER 24H: HCPCS | Performed by: SOCIAL WORKER
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
## 2022-06-01 NOTE — PROGRESS NOTES
Group Therapy Progress Notes     Client Initial Individualized Goals for Treatment:     Will increase effective use of support / increase ability to ask for help. Identify support needs, create a list of things you could use help with, Identify attitudes or beliefs about asking for help that interfere with your ability to ask for help and identify more helpful attitudes and Create a return to work, return to school, or return to daily routines plan.    Area of Treatment Focus:  Community Resources / Support and Discharge Planning    Therapeutic Interventions/Treatment Strategies:  Engage in safety planning when indicated  Facilitate increased self awareness  Provide education regarding warning signs  Teach adaptive coping skills and communication skills    Response to Treatment Strategies:  Accepted Feedback, Gave Feedback and Listened     Name of Groups:  Warning Signs - Time: 11:10-12:00    Description and Therapeutic Outcome:   OT group - Recognizing warning signs of relapse - aftercare planning  This group provides psychoeducation related to developing strategies/skills to support effective identification of warning signs as well as identifying strategies to prevent MH relapse.  Clients will review what can be done day-to-day to cope and manage symptoms as well as create a modified action plan.    Brittney notes that one of her warning signs with withdrawing from people.        Is this a Weekly Review of the Progress on the Treatment Plan?  NO    Are Treatment Plan Goals being addressed?  YES      Are Treatment Plan Strategies to Address Goals Effective?  YES      Are there any current contracts in place?  YES

## 2022-06-02 ENCOUNTER — TELEPHONE (OUTPATIENT)
Dept: BEHAVIORAL HEALTH | Facility: HOSPITAL | Age: 57
End: 2022-06-02
Payer: COMMERCIAL

## 2022-06-03 ENCOUNTER — HOSPITAL ENCOUNTER (OUTPATIENT)
Dept: BEHAVIORAL HEALTH | Facility: HOSPITAL | Age: 57
Discharge: HOME | End: 2022-06-03
Attending: PSYCHIATRY & NEUROLOGY | Admitting: PSYCHIATRY & NEUROLOGY
Payer: COMMERCIAL

## 2022-06-03 PROCEDURE — H0035 MH PARTIAL HOSP TX UNDER 24H: HCPCS | Performed by: SOCIAL WORKER
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
## 2022-06-03 NOTE — PROGRESS NOTES
Group Therapy Progress Notes     Client Initial Individualized Goals for Treatment: Will increase effective use of support / increase ability to ask for help. Identify support needs, create a list of things you could use help with, Identify attitudes or beliefs about asking for help that interfere with your ability to ask for help and identify more helpful attitudes and Create a return to work, return to school, or return to daily routines plan.    Area of Treatment Focus:  Symptom Stabilization and Management and Community Resources / Support and Discharge Planning    Therapeutic Interventions/Treatment Strategies:  Assist clients in establishing / strengthening support network  Assist to identify treatment goals  Assist with discharge planning  Assess / reassess for appropriate therapy program involvement, encourage participation in therapies  Facilitate increased self awareness  Teach adaptive coping skills and communication skills  Use reality based supportive approach    Response to Treatment Strategies:  Accepted Feedback, Gave Feedback, Listened , Focused on Goals, Accepted Support and Alert    Name of Groups:  Psychotherapy Wrapup Group 2-3pm    Description and Therapeutic Outcome:   In psychotherapy wrap up group, Brittney reviewed her takeaways from today.  She identified the discussion on think feel choose triangle resonated with her issues with PTSD; she identified AAA that she is aware and accepts these past tendencies and now will be taking action to interrupt negative behaviors..  She also appreciated the Simplifying Life worksheet from OT and related this to changing one thing at a time.  Reviewed plans for the weekend including brother/sister coming up.  She is continuing to work on setting boundaries with family and friends and shared some recent difficulties surrounding this; provided education surrounding effective communication strategies and highlighted areas patient is improving. Axis was a  great group member who was very participatory during group today and offered good feedback to peers; she remains appropriate for PHP.    Is this a Weekly Review of the Progress on the Treatment Plan?  NO    Are Treatment Plan Goals being addressed?  YES      Are Treatment Plan Strategies to Address Goals Effective?  YES      Are there any current contracts in place?  YES

## 2022-06-03 NOTE — PROGRESS NOTES
Group Therapy Progress Notes     Client Initial Individualized Goals for Treatment: Will increase effective use of support / increase ability to ask for help. Identify support needs, create a list of things you could use help with, Identify attitudes or beliefs about asking for help that interfere with your ability to ask for help and identify more helpful attitudes and Create a return to work, return to school, or return to daily routines plan.    Area of Treatment Focus:  Community Resources / Support and Discharge Planning    Therapeutic Interventions/Treatment Strategies:  Assist clients in establishing / strengthening support network  Assist with discharge planning  Engage in safety planning when indicated  Facilitate increased self awareness  Teach adaptive coping skills and communication skills  Use reality based supportive approach    Response to Treatment Strategies:  Accepted Feedback, Gave Feedback, Listened , Focused on Goals, Attentive and Accepted Support    Name of Groups:  Stress Management - Time: 11:10-12:00    Description and Therapeutic Outcome:   OT Psychoeducation Group: Stress Management - Simplifying Your Life    Education facilitated on how simplifying your life can assist with these areas: saving money, bring clarity/create meaningful connections, helps with focus, decreases stress, and increases vianney.  [place] Talk: A Rich Life With Less Stuff by [employer] presented to group.  Handout provided and discussion had around 13 easy ways to simplify your life (1. Keep perspective, 2. Let go of activity/commitment, 3. Get rid of what you don t need, 4. Learn to say no, 5. There s no such thing as perfect, 6. Get rid of emotional attachments, 7. Take time for yourself, 8. Turn off technology, 9. Watch less TV, 10. Reduce debt, 11. Use vacation time, 12. Be spontaneous, 13. Go to bed early.)  Pts will have tools available to assist with  identifying individualized goals relating to changing habits  without stress.  In OT group today, Brittney presents as engaged and participatory.  States that she needs to look at getting rid of a commitment.  Reports considering quitting her job in [place] due to long commute and heavy work load.        Is this a Weekly Review of the Progress on the Treatment Plan?  NO    Are Treatment Plan Goals being addressed?  YES      Are Treatment Plan Strategies to Address Goals Effective?  YES      Are there any current contracts in place?  YES

## 2022-06-06 ENCOUNTER — HOSPITAL ENCOUNTER (OUTPATIENT)
Dept: BEHAVIORAL HEALTH | Facility: HOSPITAL | Age: 57
Discharge: HOME | End: 2022-06-06
Attending: PSYCHIATRY & NEUROLOGY
Payer: COMMERCIAL

## 2022-06-06 PROCEDURE — H0035 MH PARTIAL HOSP TX UNDER 24H: HCPCS | Performed by: SOCIAL WORKER
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
## 2022-06-06 NOTE — PROGRESS NOTES
"Group Therapy Progress Notes     Client Initial Individualized Goals for Treatment:     Will increase effective use of support / increase ability to ask for help. Identify support needs, create a list of things you could use help with, Identify attitudes or beliefs about asking for help that interfere with your ability to ask for help and identify more helpful attitudes and Create a return to work, return to school, or return to daily routines plan.    Area of Treatment Focus:  Community Resources / Support and Discharge Planning    Therapeutic Interventions/Treatment Strategies:  Assist clients in establishing / strengthening support network  Assist with discharge planning  Engage in safety planning when indicated  Facilitate increased self awareness  Teach adaptive coping skills and communication skills  Use reality based supportive approach    Response to Treatment Strategies:  Accepted Feedback, Gave Feedback, Listened  and Focused on Goals    Name of Groups:  Coping Skills - Time: 10-10:50; Emotional Regulation 1-1:50    Description and Therapeutic Outcome:     During Coping Skills group, Brittney presented as positive and provided insightful feedback to the group process. Brittney eagerly illustrated on the board the \"(Trigger) - Think, Feel, Choose\" concept. She incorporated a variety of other skills to reinforce the concept and highlighted the effectiveness of the 'Interruption' skill.     During the Emotional Regulation group, Brittney highlighted the concept of the \"THE\" skill. \"It is THE emotion and not to , intensify, or push away the emotion.\" She will also practice Hello Emotion, 3 Good Things, 3 C's; the GAP; 3 Good Things; Time Limit, Me Time, and AAA to enhance Emotional Regulation. Brittney was supportive and welcoming of a new peer. She presents as intentional regarding her wellness and remains appropriate for PHP.    Is this a Weekly Review of the Progress on the Treatment Plan?  YES    Are " Treatment Plan Goals being addressed?  YES      Are Treatment Plan Strategies to Address Goals Effective?  YES Continue Treatment goal      Are there any current contracts in place?  YES

## 2022-06-06 NOTE — PROGRESS NOTES
Group Therapy Progress Notes     Client Initial Individualized Goals for Treatment:     Will increase effective use of support / increase ability to ask for help. Identify support needs, create a list of things you could use help with, Identify attitudes or beliefs about asking for help that interfere with your ability to ask for help and identify more helpful attitudes and Create a return to work, return to school, or return to daily routines plan.     Area of Treatment Focus:  Community Resources / Support and Discharge Planning    Therapeutic Interventions/Treatment Strategies:  Assist clients in establishing / strengthening support network  Assist with discharge planning  Engage in safety planning when indicated  Facilitate increased self awareness  Teach adaptive coping skills and communication skills  Use reality based supportive approach    Response to Treatment Strategies:  Accepted Feedback, Gave Feedback, Listened , Focused on Goals and Attentive    Name of Groups:  Assertiveness - Time: 11:10-12:00    Description and Therapeutic Outcome:     Assertiveness   OT Group - Assertiveness, Boundaries, and Positive Affirmations  This group focuses on passive, aggressive, and assertive communication styles and educates clients on how each of the styles can affect our relationships and how we respond to situations. Reviewed each type of communication and benefits and consequences of each type.  Also encouraged assertive body language and the use of SAS (State the problem, Ask for what you need, and Spell out the benefits of cooperation).  Clients are given the opportunity to share scenarios where they utilized each type of communication and what the end result of each type was.    Brittney notes that she realizes that she is a passive communicator and has begun to analyze who/when she uses this communication style with and is planning on using an assertive no with some people.        Is this a Weekly Review of the  Progress on the Treatment Plan?  YES    Are Treatment Plan Goals being addressed?  YES      Are Treatment Plan Strategies to Address Goals Effective?  YES      Are there any current contracts in place?  YES

## 2022-06-06 NOTE — PROGRESS NOTES
Group Therapy Progress Notes     Client Initial Individualized Goals for Treatment: Will increase effective use of support / increase ability to ask for help. Identify support needs, create a list of things you could use help with, Identify attitudes or beliefs about asking for help that interfere with your ability to ask for help and identify more helpful attitudes and Create a return to work, return to school, or return to daily routines plan.     Area of Treatment Focus:  Community Resources / Support and Discharge Planning    Therapeutic Interventions/Treatment Strategies:  Assist clients in establishing / strengthening support network  Assist to identify treatment goals  Facilitate increased self awareness  Teach adaptive coping skills and communication skills  Use reality based supportive approach    Response to Treatment Strategies:  Accepted Feedback, Gave Feedback, Focused on Goals, Attentive and Accepted Support    Name of Groups:  Psychotherapy Wrap Up group 2-3 pm    Description and Therapeutic Outcome:   During psychotherapy wrapup group, Brittney reviewed her takeaways from today. Brittney was tired today, but remained engaged and provided excellent feedback to group peers. She reported she assisted in teaching skills this morning and giving an overview to a new member; she stated this was helpful and assisted with repetition to ingrain the skills.  She advised they discussed communication styles in OT and will be working on incorporating assertiveness when saying no and setting boundaries with family/friends; she also incorporated think feel choose in choosing a positive response in these situations rather than getting depressed or hopeless.  She stated tonight she plans on taking a nap and doing some yardwork while working on deep breathing and time limit.  No safety concerns identified - she remains appropriate for PHP.       Is this a Weekly Review of the Progress on the Treatment Plan?  YES    Are  Treatment Plan Goals being addressed?  YES      Are Treatment Plan Strategies to Address Goals Effective?  YES      Are there any current contracts in place?  YES

## 2022-06-07 ENCOUNTER — HOSPITAL ENCOUNTER (OUTPATIENT)
Dept: BEHAVIORAL HEALTH | Facility: HOSPITAL | Age: 57
Discharge: HOME | End: 2022-06-07
Attending: PSYCHIATRY & NEUROLOGY
Payer: COMMERCIAL

## 2022-06-07 PROCEDURE — H0035 MH PARTIAL HOSP TX UNDER 24H: HCPCS | Performed by: SOCIAL WORKER

## 2022-06-07 ASSESSMENT — ANXIETY QUESTIONNAIRES
3. WORRYING TOO MUCH ABOUT DIFFERENT THINGS: MORE THAN HALF THE DAYS
2. NOT BEING ABLE TO STOP OR CONTROL WORRYING: SEVERAL DAYS
6. BECOMING EASILY ANNOYED OR IRRITABLE: NOT AT ALL
1. FEELING NERVOUS, ANXIOUS, OR ON EDGE: SEVERAL DAYS
GAD7 TOTAL SCORE: 6
7. FEELING AFRAID AS IF SOMETHING AWFUL MIGHT HAPPEN: SEVERAL DAYS
5. BEING SO RESTLESS THAT IT IS HARD TO SIT STILL: NOT AT ALL
4. TROUBLE RELAXING: SEVERAL DAYS
GAD7 TOTAL SCORE: 6

## 2022-06-07 ASSESSMENT — PATIENT HEALTH QUESTIONNAIRE - PHQ9: SUM OF ALL RESPONSES TO PHQ QUESTIONS 1-9: 8

## 2022-06-07 NOTE — PROGRESS NOTES
Group Therapy Progress Notes     Client Initial Individualized Goals for Treatment: Will increase effective use of support / increase ability to ask for help. Identify support needs, create a list of things you could use help with, Identify attitudes or beliefs about asking for help that interfere with your ability to ask for help and identify more helpful attitudes and Create a return to work, return to school, or return to daily routines plan.    Area of Treatment Focus:  Community Resources / Support and Discharge Planning    Therapeutic Interventions/Treatment Strategies:  Assist clients in establishing / strengthening support network  Assist with discharge planning  Engage in safety planning when indicated  Facilitate increased self awareness  Teach adaptive coping skills and communication skills  Use reality based supportive approach    Response to Treatment Strategies:  Accepted Feedback, Gave Feedback, Listened , Focused on Goals, Accepted Support and Alert    Name of Groups:  Employment Planning - Time: 11:10-12:00    Description and Therapeutic Outcome:   OT life skills group - Finding Purpose  This group focus involves clients identifying their purpose in life.  Education and handouts provided on this.  Discussed how our purposeful activities can change throughout our life span.  Discussion on setting healthy boundaries and finding time for self care as our purposeful activities can cause stress and anxiety in life.  Encouraged a healthy balanced lifestyle and seeking out purposeful activities.  In OT group today, Brittney presented as focused and engaged.  Shares good input and suggestions with her peers.  Brittney reports her purpose is focusing and taking care of herself at this time.         Is this a Weekly Review of the Progress on the Treatment Plan?  NO    Are Treatment Plan Goals being addressed?  YES      Are Treatment Plan Strategies to Address Goals Effective?  YES      Are there any current  contracts in place?  YES

## 2022-06-07 NOTE — PROGRESS NOTES
Group Therapy Progress Notes     Client Initial Individualized Goals for Treatment:     Will Improve Mindfulness / Stay in the Here and Now Intentionally bringing your attention to something beautiful, pleasant, or interesting that is occurring or is present in your immediate environment or experience on a regular basis.    Area of Treatment Focus:  Symptom Stabilization and Management    Therapeutic Interventions/Treatment Strategies:  Assist clients in establishing / strengthening support network  Assist to identify treatment goals  Assist with discharge planning  Engage in safety planning when indicated  Facilitate increased self awareness  Teach adaptive coping skills and communication skills  Use reality based supportive approach    Response to Treatment Strategies:  Accepted Feedback, Gave Feedback, Listened  and Focused on Goals    Name of Groups:  Psychotherapy group 9-10a    Description and Therapeutic Outcome:   In psychotherapy group, Brittney reviewed her evening,  she noted that she was much more tired today and reviewed that she is wondering if it is her body coming off of covid a month ago, the depression or the new medication she started.  Reviewed that if she is tired right now, her body likely needs it.  She plans to continue to monitor but is frustrated because this is not like her and she does not like how it feels.   Is practicing the concept of using the positive and not the negative - good discussion about not focusing on the why but the how. Brittney will continue to work on being assertive and say no, plans to be intentional tonight.  She remains appropriate for PHP.       Is this a Weekly Review of the Progress on the Treatment Plan?  No    Are Treatment Plan Goals being addressed?  YES      Are Treatment Plan Strategies to Address Goals Effective?  YES      Are there any current contracts in place?  YES
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
## 2022-06-07 NOTE — PROGRESS NOTES
"Group Therapy Progress Notes     Client Initial Individualized Goals for Treatment:     Will Improve Mindfulness / Stay in the Here and Now Intentionally bringing your attention to something beautiful, pleasant, or interesting that is occurring or is present in your immediate environment or experience on a regular basis.    Area of Treatment Focus:  Symptom Stabilization and Management    Therapeutic Interventions/Treatment Strategies:  Assist clients in establishing / strengthening support network  Assist to identify treatment goals  Assist with discharge planning  Engage in safety planning when indicated  Facilitate increased self awareness  Teach adaptive coping skills and communication skills  Use reality based supportive approach    Response to Treatment Strategies:  Accepted Feedback, Gave Feedback, Listened  and Focused on Goals    Name of Groups:  Psychotherapy group 9-10a    Description and Therapeutic Outcome:   In psychotherapy group, Brittney reviewed her evening. She shares that she ran some errands for herself last night and ended up also taking a friend who needed a ride shopping as well. Brittney shares that she was ok with bringing him because she was already on her way to the store. She states that she used Let Go or Be Dragged skill when ruminating about some challenges a friend is struggling with and Brittney discusses how she struggles with taking on the emotions of those that she cares about.  Discussed common humanity and just like me skill, Brittney identified with both skills and states \"that's exactly what I do\".  She states that she was exhausted last night and went to bed at 9pm.  She continues to be appropriate for PHP.       Is this a Weekly Review of the Progress on the Treatment Plan?  No    Are Treatment Plan Goals being addressed?  YES      Are Treatment Plan Strategies to Address Goals Effective?  YES      Are there any current contracts in place?  YES           "

## 2022-06-07 NOTE — PROGRESS NOTES
"Group Therapy Progress Notes     Client Initial Individualized Goals for Treatment:     Will Improve Mindfulness / Stay in the Here and Now Intentionally bringing your attention to something beautiful, pleasant, or interesting that is occurring or is present in your immediate environment or experience on a regular basis.    Area of Treatment Focus:  Symptom Stabilization and Management    Therapeutic Interventions/Treatment Strategies:  Assist clients in establishing / strengthening support network  Assist to identify treatment goals  Assist with discharge planning  Engage in safety planning when indicated  Facilitate increased self awareness  Teach adaptive coping skills and communication skills  Use reality based supportive approach    Response to Treatment Strategies:  Accepted Feedback, Gave Feedback, Listened  and Focused on Goals    Name of Groups:  Psychotherapy group 9-10a    Description and Therapeutic Outcome:   In psychotherapy group, Brittney presents as engaged and talkative.  She shares that she had a busier night than planned and reviews that she agreed to drive a friend to the [employer], then she gave another friend a ride home. She states that these are friends that she trusts and they also help her out when she needs help with yard work, etc.  Brittney discussed that as she learned more about boudaries and co-dependency she has become more aware about how much time she gives to others compared to herself.  Brittney shares that she plans to start \"sticking to my NO\" and start focusing on ehr own self care.  She shares that she did use present moment and gratitude last night as well. She continues to be appropriate for PHP.    Is this a Weekly Review of the Progress on the Treatment Plan?  No    Are Treatment Plan Goals being addressed?  YES      Are Treatment Plan Strategies to Address Goals Effective?  YES      Are there any current contracts in place?  YES  Cor         "
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
## 2022-06-07 NOTE — PROGRESS NOTES
"Group Therapy Progress Notes     Client Initial Individualized Goals for Treatment:     Will increase effective use of support / increase ability to ask for help. Identify support needs, create a list of things you could use help with, Identify attitudes or beliefs about asking for help that interfere with your ability to ask for help and identify more helpful attitudes and Create a return to work, return to school, or return to daily routines plan.    Area of Treatment Focus:  Community Resources / Support and Discharge Planning    Therapeutic Interventions/Treatment Strategies:  Assist clients in establishing / strengthening support network  Assist with discharge planning  Engage in safety planning when indicated  Facilitate increased self awareness  Teach adaptive coping skills and communication skills  Use reality based supportive approach    Response to Treatment Strategies:  Accepted Feedback, Gave Feedback, Listened  and Focused on Goals    Name of Groups:  Psychotherapy group 9-10a    Description and Therapeutic Outcome:   In psychotherapy group, Brittney shares that she had an ok night and 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  She states that she was frustrated because the carlos who she refers to as her \"stalker\" sat at the bar during her shift again and brought her flowers.  She describes that this man is 86 years old and an alcoholic, she states that she was supportive to him when his wife passed away and that he has \"attached\" himself to Brittney since his wife's death.  Brittney states that she practiced assertiveness skills when the older man was making inappropriate comments to her and she also practiced Focus Mode and stayed focused on her tasks for her job to keep her mind occupied so that she did not ruminate about past memories or about the older man.  Brittney is very engaged this morning and also provides positive feedback to her peers in the group, she continues to be appropriate for " PHP.    Is this a Weekly Review of the Progress on the Treatment Plan?  NO    Are Treatment Plan Goals being addressed?  YES      Are Treatment Plan Strategies to Address Goals Effective?  YES      Are there any current contracts in place?  YES

## 2022-06-07 NOTE — PROGRESS NOTES
Group Therapy Progress Notes     Client Initial Individualized Goals for Treatment:     Will Improve Mindfulness / Stay in the Here and Now Intentionally bringing your attention to something beautiful, pleasant, or interesting that is occurring or is present in your immediate environment or experience on a regular basis.    Area of Treatment Focus:  Symptom Stabilization and Management    Therapeutic Interventions/Treatment Strategies:  Assist clients in establishing / strengthening support network  Assist to identify treatment goals  Assist with discharge planning  Engage in safety planning when indicated  Facilitate increased self awareness  Teach adaptive coping skills and communication skills  Use reality based supportive approach    Response to Treatment Strategies:  Accepted Feedback, Gave Feedback, Listened  and Focused on Goals    Name of Groups:  Psychotherapy group 9-10am    Description and Therapeutic Outcome:   In psychotherapy group, Adry reviews her evening. She shares that she had a good night and did yard work with the help of a young man who she hired to help her with lawn maintenance.  She states that she did a lot of thinking and reflecting while doing yard work and that she found this very helpful and peaceful.  Brittney shares that used the awareness and AAA skill while she was deep in thought and focused on how she can improve applying skills to help her with boundaries.  She states that she also used present moment as a grounding skill to help keep her in the moment and not drifting into negative thoughts.  Brittney continues to be appropriate for PHP.    Is this a Weekly Review of the Progress on the Treatment Plan?  No    Are Treatment Plan Goals being addressed?  YES      Are Treatment Plan Strategies to Address Goals Effective?  YES      Are there any current contracts in place?  YES

## 2022-06-07 NOTE — PROGRESS NOTES
"Group Therapy Progress Notes     Client Initial Individualized Goals for Treatment:     Will increase effective use of support / increase ability to ask for help. Identify support needs, create a list of things you could use help with, Identify attitudes or beliefs about asking for help that interfere with your ability to ask for help and identify more helpful attitudes and Create a return to work, return to school, or return to daily routines plan.    Area of Treatment Focus:  Community Resources / Support and Discharge Planning    Therapeutic Interventions/Treatment Strategies:  Assist clients in establishing / strengthening support network  Assist with discharge planning  Engage in safety planning when indicated  Facilitate increased self awareness  Teach adaptive coping skills and communication skills  Use reality based supportive approach    Response to Treatment Strategies:  Accepted Feedback, Gave Feedback, Listened  and Focused on Goals    Name of Groups:  Spirituality - Time: 10-10:50; Affirmations 1-1:50    Description and Therapeutic Outcome:     During Spirituality group, Brittney presented as alert, focused, and responsive. She eagerly shared her knowledge and experience with her spirituality and it's significance to her. She was able to provide an illustration on the board and incorporate the daily skills taught in the program. She especially highlighted the\" [place]; 3 C's; 3 Good Things; Gratitude; Present Moment; Think Feel Choose; Know My Truth; AAA; Awareness; I am [place] It as well as bringing hope regarding the \"[place]\".    During the Affirmations group, Brittney received positive affirmations from peers as well as providing positive affirmations to peers. She identified that she will focus on \"No matter what problems, frustrations,worries I have today, I am a growing, changing, caring person. Brittney continues to present as positive, hopeful and intentional regarding there " wellness.    Is this a Weekly Review of the Progress on the Treatment Plan?  NO    Are Treatment Plan Goals being addressed?  YES      Are Treatment Plan Strategies to Address Goals Effective?  YES      Are there any current contracts in place?  YES

## 2022-06-08 NOTE — PROGRESS NOTES
Group Therapy Progress Notes     Client Initial Individualized Goals for Treatment: Will increase effective use of support / increase ability to ask for help. Identify support needs, create a list of things you could use help with, Identify attitudes or beliefs about asking for help that interfere with your ability to ask for help and identify more helpful attitudes and Create a return to work, return to school, or return to daily routines plan.     Area of Treatment Focus:  Community Resources / Support and Discharge Planning    Therapeutic Interventions/Treatment Strategies:  Assist clients in establishing / strengthening support network  Assist to identify treatment goals  Facilitate increased self awareness  Teach adaptive coping skills and communication skills  Use reality based supportive approach    Response to Treatment Strategies:  Accepted Feedback, Gave Feedback, Focused on Goals, Attentive and Accepted Support    Name of Groups:  Psychotherapy Wrap Up group 2-3 pm    Description and Therapeutic Outcome:   During psychotherapy wrapup group, Brittney reviewed her takeaways from today and the program - she reviewed that she taught about the medicine wheel today - discussed the concept of a stove - having high, med, low and warm settings related to goals and things that need to be done - prioritizing things.  She reviewed her big take aways from the program - feels like she is doing a lot better on the way out than when she came in.  Reviewed 3 cycles of 21 and interruptions of thought process - has been able to practice this a lot - also the concept of being at a fork in the road and have the ability to make choices about what she gives head space to.  Reviewed her supports - her daughter, herself, her creator and nature and reading.  She also leaves for a trip to [place] in a couple weeks so is really looking forward to that as a healthy distraction.  Brittney completed the PHP program today - see discharge  summary for more details.      Is this a Weekly Review of the Progress on the Treatment Plan?  No    Are Treatment Plan Goals being addressed?  YES      Are Treatment Plan Strategies to Address Goals Effective?  YES      Are there any current contracts in place?  YES

## 2022-06-08 NOTE — PROGRESS NOTES
Group Therapy Progress Notes     Client Initial Individualized Goals for Treatment:     Will increase effective use of support / increase ability to ask for help. Identify support needs, create a list of things you could use help with, Identify attitudes or beliefs about asking for help that interfere with your ability to ask for help and identify more helpful attitudes and Create a return to work, return to school, or return to daily routines plan.    Area of Treatment Focus:  Community Resources / Support and Discharge Planning    Therapeutic Interventions/Treatment Strategies:  Assist clients in establishing / strengthening support network  Assist with discharge planning  Engage in safety planning when indicated  Facilitate increased self awareness  Teach adaptive coping skills and communication skills  Use reality based supportive approach    Response to Treatment Strategies:  Accepted Feedback, Gave Feedback, Listened  and Focused on Goals    Name of Groups:  Psychotherapy wrap up group - 2-3    Description and Therapeutic Outcome:   Brittney was not present in this group today.      Is this a Weekly Review of the Progress on the Treatment Plan?  NO    Are Treatment Plan Goals being addressed?  YES      Are Treatment Plan Strategies to Address Goals Effective?  YES      Are there any current contracts in place?  YES

## 2024-04-29 ENCOUNTER — MEDICAL CORRESPONDENCE (OUTPATIENT)
Dept: HEALTH INFORMATION MANAGEMENT | Facility: CLINIC | Age: 59
End: 2024-04-29

## 2024-10-29 ENCOUNTER — TRANSFERRED RECORDS (OUTPATIENT)
Dept: HEALTH INFORMATION MANAGEMENT | Facility: CLINIC | Age: 59
End: 2024-10-29

## 2024-10-29 ENCOUNTER — TRANSCRIBE ORDERS (OUTPATIENT)
Dept: OTHER | Age: 59
End: 2024-10-29

## 2024-10-29 DIAGNOSIS — U09.9 LONG COVID: Primary | ICD-10-CM
